# Patient Record
Sex: FEMALE | Race: ASIAN | NOT HISPANIC OR LATINO | Employment: UNEMPLOYED | URBAN - METROPOLITAN AREA
[De-identification: names, ages, dates, MRNs, and addresses within clinical notes are randomized per-mention and may not be internally consistent; named-entity substitution may affect disease eponyms.]

---

## 2021-06-30 ENCOUNTER — PATIENT OUTREACH (OUTPATIENT)
Dept: INFECTIOUS DISEASES | Facility: HOSPITAL | Age: 66
End: 2021-06-30

## 2023-01-27 DIAGNOSIS — M79.2 NEURALGIA AND NEURITIS: Primary | ICD-10-CM

## 2023-03-02 DIAGNOSIS — M79.2 NEURALGIA AND NEURITIS: Primary | ICD-10-CM

## 2024-03-18 ENCOUNTER — TELEPHONE (OUTPATIENT)
Dept: SURGERY | Facility: CLINIC | Age: 69
End: 2024-03-18
Payer: MEDICARE

## 2024-03-18 ENCOUNTER — DOCUMENTATION ONLY (OUTPATIENT)
Dept: HEMATOLOGY/ONCOLOGY | Facility: CLINIC | Age: 69
End: 2024-03-18
Payer: MEDICARE

## 2024-03-18 NOTE — TELEPHONE ENCOUNTER
LVM, for pt. To call in regards to us receiving a message that she needs an appt with colorectal surgeon.

## 2024-03-18 NOTE — TELEPHONE ENCOUNTER
----- Message from Angela Alberts sent at 3/18/2024  4:22 PM CDT -----  Regarding: appt  Type:  Sooner Apoointment Request      Name of Caller:pt    When is the first available appointment?dept booked     Symptoms: Colon      Best Call Back Number: [phone]      Additional Information: pt is looking to get schedule.  please call to discuss.

## 2024-03-18 NOTE — TELEPHONE ENCOUNTER
Dr. Rivera doesn't accept Medicaid.  Message sent back to sender, Angela Alberts to notify patient to call the Patient Access Escalation Line @ [phone] to schedule an appointment.  Len

## 2024-03-19 ENCOUNTER — TELEPHONE (OUTPATIENT)
Dept: SURGERY | Facility: CLINIC | Age: 69
End: 2024-03-19
Payer: MEDICARE

## 2024-03-19 NOTE — TELEPHONE ENCOUNTER
I called and spoke to patient about scheduling her to see Dr. Rivera.  She needs to get her Medicare insurance into Epic before I can schedule the appointment since Dr. Rivera doesn't accept Medicaid.   I transferred her to the phone  to get the Medicare information.  I'll call her back to schedule an appointment.  Len

## 2024-03-19 NOTE — TELEPHONE ENCOUNTER
Sent to Dr Nicole Vanegas MA, so they can call patients spouse back due to them having medicare coverage. ----- Message from Niurka Calle LPN sent at 3/19/2024 10:36 AM CDT -----  Colorectal surgeon  ----- Message -----  From: Ela Johnson  Sent: 3/19/2024  10:08 AM CDT  To: [facility]    Type: Need Medical Advice   Who Called: spouse of patient   Best callback number: [phone]  Additional Information: patient spouse called to schedule an appointment for the patient to see Argentina Rivera, he stated the patient has medicare coverage with [facility]  Please call to further assist, Thanks.

## 2024-03-19 NOTE — TELEPHONE ENCOUNTER
Left message stating we need medicare number/ social security number updated in our Epic system, someone put wrong information in. ----- Message from Len Esquivel MA sent at 3/19/2024 10:40 AM CDT -----  Patient needs to call the registration first to put her insurance into EPIC before I can schedule her.  Thanks,  Len  ----- Message -----  From: Essence Adames MA  Sent: 3/19/2024  10:39 AM CDT  To: Len Esquivel MA    Pt called back today saying that its medicare coverage, not medicaid. Can you please call patients spouse back to schedule appt ? Thank you   ----- Message -----  From: Niurka Calle LPN  Sent: 3/19/2024  10:37 AM CDT  To: Essence Aadmes MA    Colorectal surgeon  ----- Message -----  From: Ela Johnson  Sent: 3/19/2024  10:08 AM CDT  To: [facility]    Type: Need Medical Advice   Who Called: spouse of patient   Best callback number: [phone]  Additional Information: patient spouse called to schedule an appointment for the patient to see Argentina Rivera, he stated the patient has medicare coverage with [facility]  Please call to further assist, Thanks.

## 2024-03-19 NOTE — TELEPHONE ENCOUNTER
----- Message from Essence Adames MA sent at 3/19/2024 10:38 AM CDT -----  Pt called back today saying that its medicare coverage, not medicaid. Can you please call patients spouse back to schedule appt ? Thank you   ----- Message -----  From: Niurka Calle LPN  Sent: 3/19/2024  10:37 AM CDT  To: Essence Adames MA    Colorectal surgeon  ----- Message -----  From: Ela Johnson  Sent: 3/19/2024  10:08 AM CDT  To: [facility]    Type: Need Medical Advice   Who Called: spouse of patient   Best callback number: [phone]  Additional Information: patient spouse called to schedule an appointment for the patient to see Argentina Rivera, he stated the patient has medicare coverage with [facility]  Please call to further assist, Thanks.

## 2024-03-19 NOTE — TELEPHONE ENCOUNTER
I called and spoke to patients friend Geremias Chambers to schedule her to see Dr. Rivera in [place] on Tuesday, 4/30/24 @ 2:30pm for fecal incontinence.  Len

## 2024-03-19 NOTE — TELEPHONE ENCOUNTER
----- Message from Ayesha Davison sent at 3/19/2024 12:30 PM CDT -----  Type: Needs Medical Advice  Who Called:  Patient   Symptoms (please be specific):    How long has patient had these symptoms:    Pharmacy name and phone #:    Best Call Back Number: [phone]  Additional Information: Patient is requesting a call back to schedule an appt..

## 2024-03-22 DIAGNOSIS — M79.671 PAIN IN RIGHT FOOT: Primary | ICD-10-CM

## 2024-03-22 DIAGNOSIS — M79.672 PAIN IN LEFT FOOT: ICD-10-CM

## 2024-03-22 DIAGNOSIS — M77.31 CALCANEAL SPUR, RIGHT FOOT: Primary | ICD-10-CM

## 2024-04-30 ENCOUNTER — TELEPHONE (OUTPATIENT)
Dept: SURGERY | Facility: CLINIC | Age: 69
End: 2024-04-30
Payer: MEDICARE

## 2024-04-30 NOTE — TELEPHONE ENCOUNTER
----- Message from Wang Canchola sent at 4/30/2024 12:10 PM CDT -----  Regarding: Appt requested  Contact: [phone]  Hi, pt called to schedule an appt for Fecal incontinence. Pls call the pt at [phone](Geremias/Friend that translate)  to discuss scheduling the appt.   Thank you.

## 2024-04-30 NOTE — TELEPHONE ENCOUNTER
----- Message from Ayala De La Paz, Patient Care Assistant sent at 4/30/2024 10:55 AM CDT -----  Regarding: appointment  Contact: Geremias pt's friend  Type: Needs Medical Advice    Who Called:  Geremias pt's friend    Best Call Back Number:      Additional Information: Geremias pt's friend states he would like a callback regarding the pt's appointment on 4/30/24. Please call to advise. Thanks!

## 2024-04-30 NOTE — TELEPHONE ENCOUNTER
I called and spoke to Geremias about patients appointment being canceled for today, Tuesday, 4/30/24 @ 2:30pm with Dr. Rivera due to her insurance not being accepted.  He called [facility] to inquire about who takes her insurance.  Len

## 2024-05-01 ENCOUNTER — TELEPHONE (OUTPATIENT)
Dept: SURGERY | Facility: CLINIC | Age: 69
End: 2024-05-01
Payer: MEDICARE

## 2024-05-01 NOTE — TELEPHONE ENCOUNTER
Spoke with Geremias, friend. Patient speaks Chinese, but Geremias will be there for appt. Appointment changed to after June 17th due to patient being out of town. Will mail new appointment slip.

## 2024-05-01 NOTE — TELEPHONE ENCOUNTER
----- Message from Mey Cristina NP sent at 5/1/2024  1:22 PM CDT -----  Regarding: FW: r/s appt  Contact: Pt @ [phone]  She's returnig your call  ----- Message -----  From: Bridgette Thompson  Sent: 5/1/2024  12:34 PM CDT  To: Ibeth Mathias Staff  Subject: r/s appt                                         Pt is calling to speak to someone in the office to r/s her appt that she is currently scheduled for; no available appts in Epic. Please call to advise. Thanks.            Additional Info:  Pt will be out of town please schedule after June 17th

## 2024-06-20 ENCOUNTER — OFFICE VISIT (OUTPATIENT)
Dept: SURGERY | Facility: CLINIC | Age: 69
End: 2024-06-20
Payer: MEDICARE

## 2024-06-20 VITALS — WEIGHT: 126.13 LBS | HEART RATE: 61 BPM | DIASTOLIC BLOOD PRESSURE: 75 MMHG | SYSTOLIC BLOOD PRESSURE: 129 MMHG

## 2024-06-20 DIAGNOSIS — R39.81 FUNCTIONAL URINARY INCONTINENCE: Primary | ICD-10-CM

## 2024-06-20 DIAGNOSIS — R15.1 FECAL SMEARING: ICD-10-CM

## 2024-06-20 PROCEDURE — 3288F FALL RISK ASSESSMENT DOCD: CPT | Mod: CPTII,S$GLB,, | Performed by: NURSE PRACTITIONER

## 2024-06-20 PROCEDURE — 1101F PT FALLS ASSESS-DOCD LE1/YR: CPT | Mod: CPTII,S$GLB,, | Performed by: NURSE PRACTITIONER

## 2024-06-20 PROCEDURE — 3078F DIAST BP <80 MM HG: CPT | Mod: CPTII,S$GLB,, | Performed by: NURSE PRACTITIONER

## 2024-06-20 PROCEDURE — 4010F ACE/ARB THERAPY RXD/TAKEN: CPT | Mod: CPTII,S$GLB,, | Performed by: NURSE PRACTITIONER

## 2024-06-20 PROCEDURE — 3074F SYST BP LT 130 MM HG: CPT | Mod: CPTII,S$GLB,, | Performed by: NURSE PRACTITIONER

## 2024-06-20 PROCEDURE — 1159F MED LIST DOCD IN RCRD: CPT | Mod: CPTII,S$GLB,, | Performed by: NURSE PRACTITIONER

## 2024-06-20 PROCEDURE — 1126F AMNT PAIN NOTED NONE PRSNT: CPT | Mod: CPTII,S$GLB,, | Performed by: NURSE PRACTITIONER

## 2024-06-20 PROCEDURE — 99999 PR PBB SHADOW E&M-EST. PATIENT-LVL III: CPT | Mod: PBBFAC,,, | Performed by: NURSE PRACTITIONER

## 2024-06-20 PROCEDURE — 99204 OFFICE O/P NEW MOD 45 MIN: CPT | Mod: S$GLB,,, | Performed by: NURSE PRACTITIONER

## 2024-06-20 RX ORDER — LOSARTAN POTASSIUM 25 MG/1
1 TABLET ORAL DAILY
COMMUNITY
Start: 2024-06-17

## 2024-06-20 RX ORDER — CLOPIDOGREL BISULFATE 75 MG/1
TABLET ORAL
COMMUNITY
Start: 2024-06-17

## 2024-06-20 RX ORDER — BENZONATATE 100 MG/1
100 CAPSULE ORAL 3 TIMES DAILY PRN
COMMUNITY
Start: 2023-11-09

## 2024-06-20 RX ORDER — LISINOPRIL 20 MG/1
20 TABLET ORAL
COMMUNITY

## 2024-06-20 RX ORDER — ERGOCALCIFEROL 1.25 MG/1
1 CAPSULE ORAL
COMMUNITY
Start: 2024-03-13

## 2024-06-20 RX ORDER — ATORVASTATIN CALCIUM 40 MG/1
TABLET, FILM COATED ORAL
COMMUNITY
Start: 2024-06-17

## 2024-06-20 RX ORDER — LEVOTHYROXINE SODIUM 50 UG/1
TABLET ORAL
COMMUNITY
Start: 2024-06-17

## 2024-06-20 NOTE — PROGRESS NOTES
CRS Office Visit History and Physical    Referring Md:   Self, Aaareferral  No address on file    SUBJECTIVE:     Chief Complaint: fecal incontinence    History of Present Illness:  The patient is new patient to this practice.   Course is as follows:  Patient is a 68 y.o. female presents with fecal incontinence.She is here with her friend Geremias, who is her . [facility]   >1 a year is symptom onset  She is unaware it is happening  Fecal smearing.   3 vaginal deliveries   Has urinary incontinence  Does not have a BM every day  When she does have a BM, its bristol type one stools.   Rectal pain sometimes  No rectal bleeding.    Family history of colorectal cancer or IBD: no.  Mother ovarian cancer    Review of patient's allergies indicates:  No Known Allergies    No past medical history on file.  No past surgical history on file.  No family history on file.  Social History     Tobacco Use    Smoking status: Never    Smokeless tobacco: Never        Review of Systems:  Review of Systems   Constitutional:  Negative for chills and fever.       OBJECTIVE:     Vital Signs (Most Recent)  /75 (BP Location: Right arm, Patient Position: Sitting, BP Method: Medium (Automatic))   Pulse 61   Wt 57.2 kg (126 lb 1.7 oz)     Physical Exam:  General:  female in no distress   Neuro: Alert and oriented to person, place, and time.  Moves all extremities.     HEENT: No icterus.  Trachea midline  Respiratory: Respirations are even and unlabored, no cough or audible wheezing  Skin: Warm dry and intact, No visible rashes, no jaundice    Labs reviewed today:  Lab Results   Component Value Date    WBC 4.8 03/13/2024    HGB 13.3 03/13/2024    HCT 39.3 03/13/2024     06/17/2021    K 3.9 06/17/2021    CREATININE 0.71 06/17/2021    BUN 23.0 06/17/2021    CO2 27 06/17/2021    TSH 2.02 06/17/2024         Anorectal Exam:    Anal Skin: Normal    Digital Rectal Exam:  Resting Tone normal  Squeeze  normal  Relaxation with bear down present  Mass none  Tenderness  absent    Anoscopy:  Verbal consent was obtained.   Clear plastic anoscope inserted.    Hemorrhoids  absent  Stigmata of bleeding  absent  Stigmata of prolapsed  absent  Distal rectal mucosa formed stool in rectal vault, unable to visualize      ASSESSMENT/PLAN:     Diagnoses and all orders for this visit:    Functional urinary incontinence  -     Ambulatory referral/consult to Physical/Occupational Therapy; Future    Fecal smearing  -     Ambulatory referral/consult to Physical/Occupational Therapy; Future    68 y.o. F here today for FI. This could be overflow FI. Also has urinary incontinence.   For now treat her constipation w fiber and water.   Will get recent scope results, med record request faxed by Pearl FRAZIER  If no improvement f/u w MD, pt agreed to call me    IVETTE George  Colon and Rectal Surgery

## 2024-06-20 NOTE — PATIENT INSTRUCTIONS
Start fiber supplement such as Fibercon (capsule) Citrucel or Metamucil every day, increase as tolerated per  instructions   Water intake of 64 oz per day  Warm sitz baths as needed  Do not sit on the toilet for more than 5 mins at a time     Pelvic Floor PT will reach out to you to schedule. If you do not hear from them in the next few day, or if you would like to contact them to schedule the number is [phone]

## 2024-06-26 ENCOUNTER — TELEPHONE (OUTPATIENT)
Dept: SURGERY | Facility: CLINIC | Age: 69
End: 2024-06-26
Payer: MEDICARE

## 2024-06-26 DIAGNOSIS — R15.1 FECAL SMEARING: Primary | ICD-10-CM

## 2024-06-26 NOTE — TELEPHONE ENCOUNTER
----- Message from Sharon Dennison sent at 6/26/2024 10:43 AM CDT -----  Regarding: referral needed  Contact: pt @  [phone]  Pt is requesting a referral be sent to Dr. Suad Gastelum on [address] [facility] fax number [phone]  phone [phone]. Please call to advise further. Thank you for all you are doing.

## 2024-09-03 ENCOUNTER — TELEPHONE (OUTPATIENT)
Dept: OBSTETRICS AND GYNECOLOGY | Facility: CLINIC | Age: 69
End: 2024-09-03
Payer: MEDICARE

## 2024-10-01 ENCOUNTER — TELEPHONE (OUTPATIENT)
Dept: SURGERY | Facility: CLINIC | Age: 69
End: 2024-10-01
Payer: MEDICARE

## 2024-10-01 NOTE — TELEPHONE ENCOUNTER
----- Message from Med Assistant Kang sent at 10/1/2024 10:41 AM CDT -----  Regarding: RE: appt  Contact: Geremias, shania program @[phone]  Ms. Mackey returned the call.  She has completed th PFPT.  She has noticed some improvement .  If she needs and appointment, she would like to be seen within the next week because she's going out of town for 3 months. Waiting for recommendations for next step.  ----- Message -----  From: Mey Cristina NP  Sent: 10/1/2024  10:18 AM CDT  To: Pearl Conway MA  Subject: FW: appt                                         Please call pt, find out if she did PFPT. If she didn't we need to get her an appt. If she did and is still having incontinence then we need to get her an appt w Casey  ----- Message -----  From: Subha Dao  Sent: 10/1/2024  10:14 AM CDT  To: Ibeth Mathias Staff  Subject: appt                                             Caller stated pt needs future test type appts made. Pls call to discuss. Provider told pt after PT , next step will be decided. Pls call to better discuss.

## 2024-10-01 NOTE — TELEPHONE ENCOUNTER
Her friend Geremias used as  for phone convo.  There is some improvement in her symptoms.  They have included more fiber in diet.   She is about to be on vacation for 3 months  For now we agreed if she wants to get seen by Surgeon for further eval when she returns from vacation then they can let me know

## 2025-04-14 ENCOUNTER — TELEPHONE (OUTPATIENT)
Dept: SURGERY | Facility: CLINIC | Age: 70
End: 2025-04-14
Payer: MEDICARE

## 2025-04-14 NOTE — TELEPHONE ENCOUNTER
Spoke with patient and friend Geremias regarding rescheduling appointment with MD. Appointment rescheduled and details confirmed verbally over phone.

## 2025-04-14 NOTE — TELEPHONE ENCOUNTER
----- Message from ABI Mathias sent at 4/11/2025  5:25 PM CDT -----    ----- Message -----  From: Mey Cristina NP  Sent: 4/10/2025   9:34 AM CDT  To: Casey Smith Staff    This is a patient that I have seen and instructed if she continue with FI then needed MD f/u, they are scheduled with me again. Plz get with Dr. Peña and cancel w me. thanks

## 2025-04-23 ENCOUNTER — OFFICE VISIT (OUTPATIENT)
Dept: UROLOGY | Facility: CLINIC | Age: 70
End: 2025-04-23
Payer: MEDICARE

## 2025-04-23 VITALS — SYSTOLIC BLOOD PRESSURE: 125 MMHG | DIASTOLIC BLOOD PRESSURE: 74 MMHG | WEIGHT: 122.81 LBS | HEART RATE: 56 BPM

## 2025-04-23 DIAGNOSIS — N39.3 STRESS INCONTINENCE: Primary | ICD-10-CM

## 2025-04-23 PROCEDURE — 1160F RVW MEDS BY RX/DR IN RCRD: CPT | Mod: CPTII,S$GLB,,

## 2025-04-23 PROCEDURE — 1101F PT FALLS ASSESS-DOCD LE1/YR: CPT | Mod: CPTII,S$GLB,,

## 2025-04-23 PROCEDURE — 99999 PR PBB SHADOW E&M-EST. PATIENT-LVL III: CPT | Mod: PBBFAC,,,

## 2025-04-23 PROCEDURE — 3078F DIAST BP <80 MM HG: CPT | Mod: CPTII,S$GLB,,

## 2025-04-23 PROCEDURE — 3288F FALL RISK ASSESSMENT DOCD: CPT | Mod: CPTII,S$GLB,,

## 2025-04-23 PROCEDURE — 99205 OFFICE O/P NEW HI 60 MIN: CPT | Mod: S$GLB,,,

## 2025-04-23 PROCEDURE — 1159F MED LIST DOCD IN RCRD: CPT | Mod: CPTII,S$GLB,,

## 2025-04-23 PROCEDURE — 3074F SYST BP LT 130 MM HG: CPT | Mod: CPTII,S$GLB,,

## 2025-04-23 PROCEDURE — G2211 COMPLEX E/M VISIT ADD ON: HCPCS | Mod: S$GLB,,,

## 2025-04-23 PROCEDURE — 1126F AMNT PAIN NOTED NONE PRSNT: CPT | Mod: CPTII,S$GLB,,

## 2025-04-23 PROCEDURE — 4010F ACE/ARB THERAPY RXD/TAKEN: CPT | Mod: CPTII,S$GLB,,

## 2025-04-23 NOTE — PROGRESS NOTES
[facility]      New Stress Incontinence Note    4/23/2025    Referred by:  No ref. provider found    History of Present Illness    CHIEF COMPLAINT:  Patient presents today for urinary incontinence.  utilized for appt.     URINARY AND FECAL INCONTINENCE:  She reports worsening urinary incontinence over the past couple of years, particularly with coughing. She uses pads daily for management.     PREVIOUS TREATMENT:  She previously attended physical therapy. She was instructed to perform daily exercises but has not been adherent to the home exercise program.        A review of 10+ systems was conducted with pertinent positive and negative findings documented in HPI with all other systems reviewed and negative.    Past medical, family, surgical and social history reviewed as documented in chart with pertinent positive medical, family, surgical and social history detailed in HPI.    Previous ANASTASIIA therapies:  pelvic floor exercises without therapy  pelvic floor exercises guided by PFPT    Exam Findings:    Const: no acute distress, conversant and alert  Eyes: anicteric, extraocular muscles intact  ENMT: normocephalic, Nl oral membranes  Cardio: no cyanosis, nl cap refill  Pulm: no tachypnea; no resp distress  Abd: soft, no tenderness  Musc: no laceration, no tenderness  Neuro: alert; oriented x 3  Skin: warm, dry; no petichiae  Psych: no anxiety; normal speech       Assessment/Plan:    Assessment & Plan    IMPRESSION:  - Presents with stress incontinence  - Considered urethral sling surgery as potential treatment option if conservative measures fail.  - Recommend urodynamics testing to determine suitability for surgery and optimize surgical plan if patient decides to pursue this option.  - Expressed preference for conservative management at this time.    STRESS INCONTINENCE (FEMALE):  1. Explained stress incontinence as urine leakage during activities that increase abdominal pressure (e.g.,  coughing, sneezing, laughing).  2. Discussed pelvic floor anatomy and its role in urinary continence.  3. Discussed risks and benefits of urethral sling surgery, including potential for urethral erosion and need for revision.  4. Provided overview of urethral sling surgery, including placement of mesh to support the urethra.  5. Explained that urethral sling surgery does not increase cancer risk.  6. Patient to resume pelvic floor physical therapy exercises as previously instructed.  7. Referred to pelvic floor physical therapy.    FOLLOW-UP:  1. Contact the office when ready to discuss surgical options further.  2. Follow up with option for patient's daughter to join future appointment via phone to discuss treatment options.       I spent a total of 60 minutes on the day of the visit.  This includes face to face time and non-face to face time preparing to see the patient (eg, review of tests), obtaining and/or reviewing separately obtained history, documenting clinical information in the electronic or other health record, independently interpreting results and communicating results to the patient/family/caregiver, or care coordinator.     Visit complexity today is associated with medical care services that are part of the ongoing care related to the single serious and/or complex condition of Stress incontinence (ANASTASIIA). A longitudinal relationship exists or is being developed between the patient and this practitioner for the care of this condition.

## 2025-04-29 ENCOUNTER — TELEPHONE (OUTPATIENT)
Dept: UROLOGY | Facility: CLINIC | Age: 70
End: 2025-04-29
Payer: MEDICARE

## 2025-04-29 ENCOUNTER — OFFICE VISIT (OUTPATIENT)
Dept: OTOLARYNGOLOGY | Facility: CLINIC | Age: 70
End: 2025-04-29
Payer: MEDICARE

## 2025-04-29 VITALS — DIASTOLIC BLOOD PRESSURE: 87 MMHG | WEIGHT: 121.25 LBS | HEART RATE: 79 BPM | SYSTOLIC BLOOD PRESSURE: 149 MMHG

## 2025-04-29 DIAGNOSIS — R05.3 CHRONIC COUGH: Primary | ICD-10-CM

## 2025-04-29 DIAGNOSIS — R09.A2 GLOBUS SENSATION: ICD-10-CM

## 2025-04-29 DIAGNOSIS — K11.7 XEROSTOMIA: ICD-10-CM

## 2025-04-29 PROBLEM — Z98.61 S/P CORONARY ANGIOPLASTY: Status: ACTIVE | Noted: 2018-09-21

## 2025-04-29 PROBLEM — Z78.9 LANGUAGE BARRIER TO COMMUNICATION: Status: ACTIVE | Noted: 2019-03-04

## 2025-04-29 PROBLEM — E03.9 HYPOTHYROIDISM: Status: ACTIVE | Noted: 2018-11-27

## 2025-04-29 PROBLEM — R20.2 PARESTHESIA: Status: ACTIVE | Noted: 2025-04-29

## 2025-04-29 PROBLEM — R41.3 MEMORY DEFICITS: Status: ACTIVE | Noted: 2019-03-04

## 2025-04-29 PROBLEM — Z86.0100 HISTORY OF COLON POLYPS: Status: ACTIVE | Noted: 2019-10-12

## 2025-04-29 PROBLEM — Z85.42 HISTORY OF ENDOMETRIAL CANCER: Status: ACTIVE | Noted: 2024-09-11

## 2025-04-29 PROBLEM — I25.10 CORONARY ARTERY DISEASE INVOLVING NATIVE CORONARY ARTERY OF NATIVE HEART WITHOUT ANGINA PECTORIS: Status: ACTIVE | Noted: 2018-09-12

## 2025-04-29 PROBLEM — M20.60 ACQUIRED DEFORMITY OF TOE: Status: ACTIVE | Noted: 2023-08-17

## 2025-04-29 PROBLEM — N95.2 ATROPHIC VAGINITIS: Status: ACTIVE | Noted: 2021-10-11

## 2025-04-29 PROBLEM — R15.9 BOWEL INCONTINENCE: Status: ACTIVE | Noted: 2021-05-03

## 2025-04-29 PROBLEM — K57.30 DIVERTICULAR DISEASE OF COLON: Status: ACTIVE | Noted: 2019-10-12

## 2025-04-29 PROBLEM — I10 ESSENTIAL HYPERTENSION: Status: ACTIVE | Noted: 2018-09-12

## 2025-04-29 PROBLEM — K64.0 GRADE I HEMORRHOIDS: Status: ACTIVE | Noted: 2019-10-12

## 2025-04-29 PROCEDURE — 1126F AMNT PAIN NOTED NONE PRSNT: CPT | Mod: CPTII,S$GLB,, | Performed by: PHYSICIAN ASSISTANT

## 2025-04-29 PROCEDURE — 1160F RVW MEDS BY RX/DR IN RCRD: CPT | Mod: CPTII,S$GLB,, | Performed by: PHYSICIAN ASSISTANT

## 2025-04-29 PROCEDURE — 3077F SYST BP >= 140 MM HG: CPT | Mod: CPTII,S$GLB,, | Performed by: PHYSICIAN ASSISTANT

## 2025-04-29 PROCEDURE — 99204 OFFICE O/P NEW MOD 45 MIN: CPT | Mod: 25,S$GLB,, | Performed by: PHYSICIAN ASSISTANT

## 2025-04-29 PROCEDURE — 3288F FALL RISK ASSESSMENT DOCD: CPT | Mod: CPTII,S$GLB,, | Performed by: PHYSICIAN ASSISTANT

## 2025-04-29 PROCEDURE — 3079F DIAST BP 80-89 MM HG: CPT | Mod: CPTII,S$GLB,, | Performed by: PHYSICIAN ASSISTANT

## 2025-04-29 PROCEDURE — 4010F ACE/ARB THERAPY RXD/TAKEN: CPT | Mod: CPTII,S$GLB,, | Performed by: PHYSICIAN ASSISTANT

## 2025-04-29 PROCEDURE — 1159F MED LIST DOCD IN RCRD: CPT | Mod: CPTII,S$GLB,, | Performed by: PHYSICIAN ASSISTANT

## 2025-04-29 PROCEDURE — 1101F PT FALLS ASSESS-DOCD LE1/YR: CPT | Mod: CPTII,S$GLB,, | Performed by: PHYSICIAN ASSISTANT

## 2025-04-29 PROCEDURE — 99999 PR PBB SHADOW E&M-EST. PATIENT-LVL III: CPT | Mod: PBBFAC,,, | Performed by: PHYSICIAN ASSISTANT

## 2025-04-29 PROCEDURE — 31575 DIAGNOSTIC LARYNGOSCOPY: CPT | Mod: S$GLB,,, | Performed by: PHYSICIAN ASSISTANT

## 2025-04-29 RX ORDER — METOPROLOL SUCCINATE 50 MG/1
50 TABLET, EXTENDED RELEASE ORAL DAILY
COMMUNITY
Start: 2025-04-03

## 2025-04-29 RX ORDER — ALBUTEROL SULFATE 90 UG/1
2 INHALANT RESPIRATORY (INHALATION) EVERY 6 HOURS PRN
COMMUNITY
End: 2025-04-29

## 2025-04-29 RX ORDER — FAMOTIDINE 20 MG/1
20 TABLET, FILM COATED ORAL 2 TIMES DAILY
COMMUNITY
End: 2025-04-29

## 2025-04-29 RX ORDER — LORATADINE 10 MG/1
10 TABLET ORAL DAILY
COMMUNITY
End: 2025-04-29

## 2025-04-29 RX ORDER — OMEPRAZOLE 40 MG/1
40 CAPSULE, DELAYED RELEASE ORAL EVERY MORNING
Qty: 90 CAPSULE | Refills: 2 | Status: SHIPPED | OUTPATIENT
Start: 2025-04-29 | End: 2026-04-29

## 2025-04-29 RX ORDER — PREDNISOLONE ACETATE 10 MG/ML
1 SUSPENSION/ DROPS OPHTHALMIC 3 TIMES DAILY
COMMUNITY
End: 2025-04-29

## 2025-04-29 RX ORDER — PANTOPRAZOLE SODIUM 40 MG/1
40 TABLET, DELAYED RELEASE ORAL DAILY
COMMUNITY
End: 2025-04-29

## 2025-04-29 RX ORDER — OXYBUTYNIN CHLORIDE 10 MG/1
10 TABLET, EXTENDED RELEASE ORAL DAILY
COMMUNITY
End: 2025-04-29

## 2025-04-29 NOTE — TELEPHONE ENCOUNTER
Scheduled pt   ----- Message from Yara sent at 4/29/2025 11:07 AM CDT -----  Type:  Needs Medical AdviceWho Called:  PtSymptoms (please be specific):  How long has patient had these symptoms:    Pharmacy name and phone #:   Would the patient rather a call back or a response via [facility]?  Call [facility] Call Back Number:  [phone] Information:  Pt  called would  like a call back  regarding  surgery details

## 2025-04-29 NOTE — PROGRESS NOTES
Subjective:     HPI: Nel Mackey is a 69 y.o. female with mild asthma, CAD s/p angioplasty 9/21/2018, HTN, and memory deficits who was self-referred for chronic cough.    Reports developing a cough about 6 months ago with associated globus sensation.  Patient reports symptoms are worse usually when lying flat and especially overnight but can occur during the day.  Patient reports sometimes having the need to cough so strong that causes her to vomit.  She denies any dysphagia, rhinorrhea, sneezing, nasal obstruction, recurrent sinus infections, or facial pressure.   Cough is not triggered by cold liquids, deep breathing, laughing, talking, eating, or after eating.  Sometimes in his worse with bad smells.  Patient reports previously undergoing PFTs which were normal.  She does not report significant caffeine intake but does report eating often at night before bed    Current sinonasal medications include none at present.  The last course of antibiotics was recently.    She does not recall previously having allergy testing.  She denies a history of asthma.  She denies a history of reflux symptoms.    She denies a diagnosis of obstructive sleep apnea.   She does not recall a prior history of nasal trauma.  She has not had sinonasal surgery.      She is not a tobacco smoker.     Past Medical/Past Surgical History  No past medical history on file.  She has no past surgical history on file.    Family History/Social History  Her family history is not on file.  She reports that she has never smoked. She has never used smokeless tobacco.    Allergies/Immunizations  She has no known allergies.  Immunization History   Administered Date(s) Administered    COVID-19, MRNA, LN-S, PF (Pfizer) (Purple Cap) 03/04/2021        Medications   aspirin Cap  atorvastatin  clopidogreL  ergocalciferol Cap  levothyroxine  lisinopriL  metoprolol succinate  omeprazole     Review of Systems   HENT: Negative for runny nose, sinus pressure, sore  "throat, stuffy nose, trouble swallowing and voice change.      Respiratory:  Positive for cough. Negative for sleep apnea.            Objective:     BP (!) 149/87 (BP Location: Right arm, Patient Position: Sitting)   Pulse 79   Wt 55 kg (121 lb 4.1 oz)      Physical Exam  Constitutional:       Appearance: Normal appearance. She is well-developed.   HENT:      Head: Normocephalic and atraumatic.      Right Ear: External ear normal.      Left Ear: External ear normal.      Nose: No septal deviation, mucosal edema or rhinorrhea.      Right Nostril: No epistaxis.      Left Nostril: No epistaxis.      Right Turbinates: Enlarged.      Left Turbinates: Enlarged.      Right Sinus: No maxillary sinus tenderness or frontal sinus tenderness.      Left Sinus: No maxillary sinus tenderness or frontal sinus tenderness.      Mouth/Throat:      Lips: Pink. No lesions.      Tongue: No lesions. Tongue does not deviate from midline.      Palate: No mass and lesions.      Pharynx: Oropharynx is clear. Uvula midline. No pharyngeal swelling, oropharyngeal exudate, posterior oropharyngeal erythema or uvula swelling.      Tonsils: No tonsillar exudate or tonsillar abscesses.   Eyes:      Extraocular Movements: Extraocular movements intact.      Conjunctiva/sclera: Conjunctivae normal.   Neck:      Thyroid: No thyromegaly or thyroid tenderness.   Lymphadenopathy:      Cervical: No cervical adenopathy.   Psychiatric:         Mood and Affect: Mood normal.         Behavior: Behavior normal. Behavior is cooperative.          Procedure    Flexible laryngoscopy performed.  See procedure note.              Data Reviewed  I personally reviewed the chart, including any outside records, and pertinent data below:    I reviewed the following notes Internal Medicine     WBC (103/uL)   Date Value   09/19/2024 6.6     No results found for: "LYMPH"  No results found for: "EOSINOPHIL"  No results found for: "IGE"  No results found for: "EOS"    No " "results found for: "PLT"  Glucose (mg/dL)   Date Value   03/10/2023 83     CT chest 7/22/24 at OSH  "No acute intrathoracic process. Few bibasilar linear opacities may reflect atelectasis and/or scarring."    No sinus imaging available.    Assessment & Plan:     1. Chronic cough  2. Globus sensation  -    suspect multifactorial etiology for patient's cough.  - FFL with evidence of dry secretions in oropharynx and LPR changes without rhinitis  - Discussed the etiology of LPR and management strategies including nonpharmacologic treatments: eating smaller meals, eating at least 3 hours before bed, elevation of the head of bed at night, avoidance of caffeine, chocolate, nicotine and peppermint, and avoiding tight fitting clothing.    - May consider GI referral if symptoms are refractory to medication management.  -  omeprazole (PRILOSEC) 40 MG capsule; Take 1 capsule (40 mg total) by mouth every morning.  Dispense: 90 capsule; Refill: 2    3. Xerostomia  - Xylimelts QHS; Biotene mouth wash during the day  - Increase your water intake to 64-80 oz daily  - consider room humidifier     She will Follow up in about 2 months (around 6/29/2025).  I had a discussion with the patient and her friend  regarding her condition and the further workup and management options.    All questions were answered, and the patient is in agreement with the above.     Disclaimer:  This note may have been prepared utilizing voice recognition software which may result in occasional typographical errors in the text such as sound alike words.   If further clarification is needed, please contact the ENT department of [facility].  "

## 2025-04-29 NOTE — PATIENT INSTRUCTIONS
"  DRY THROAT/MOUTH Remedies:  - Over the counter Lozenges that help with moisture:  Cushing Soothing - sold with cough drops  Xylimelts - on toothpaste aisle at [facility] with dry mouth products or online, these are convenient for when you are talking and or sleeping - they stick to gumline and provide moisture so you won't be so dry overnight. You can even use them during the day when you are singing. They stay in place when you are sleeping, to reduce risk of choking on them  - Biotene mouth wash during the day for dry mouth  - Increase your water intake to 64-80 oz daily to help thin mucus  - Can consider humidifier while you sleep       LARYNGOPHARYNGEAL REFLUX  (SILENT OR ATYPICAL REFLUX)    LPR is a very challenging disease as it includes a vast range of symptoms and difficult to diagnose.      SYMPTOMS  If you have any of the following symptoms you MAY have laryngopharyngeal reflux (LPR):    1. Hoarseness  2. Sore throat  4. throat clearing, sometimes clearing thick mucous  5. chronic cough, especially cough that wakes you up from sleep  6.  "postnasal drip" without the need to blow your nose  7. Globus sensation, like the sensation of something being stuck in the throat  8.  Red, swollen or irritated larynx (voice box)    HOW  Many people with LPR do not have symptoms of heartburn. Compared to the esophagus, the voice box and the back of the throat are significantly more sensitive to the effects of acid and digestive enzymes on surrounding tissue. Acid passing quickly through the esophagus does not have a chance to irritate the area for too long.  However acid that pools in the throat or voice box can cause prolonged irritation resulting in the symptoms of LPR.    DIAGNOSIS  A common procedure performed by an ENT is called flexible laryngoscopy.  A thin tube is inserted through the nose in order to visualize the larynx (the voice box). This method can detect abnormalities in the voice box ranging from polyps to " laryngeal cancer.  This can also reveal signs of LPR, but is not 100% reliable.      If symptoms persist despite medical treatment listed below, further testing is needed.  Three commonly used tests are: a swallowing study; a direct look at the stomach and esophagus through an endoscope, and; an esophageal pH test.  Further testing is usually coordinated with a gastroenterologist.     TREATMENT  Lifestyle changes  1. Do not smoke.  Smoking will worsen reflux.    2. Avoid eating at least 2-3 hours prior to bedtime.  Try to avoid very large meals at night.    4. Weight loss.  For patient's with recent weight gain, shedding a few pounds is all that is required to improve reflux.    5. Avoid reflux triggers. These can worsen acid in the stomach or even cause the esophagus muscles to relax: caffeine, soft drinks, acidic foods (tomato, lots of fruit) mints, alcoholic beverages, particularly at night, cheese, fried foods, spicy foods, eggs, and chocolate.    6. Sleep with the head of bed elevated at least 6 inches.    Consider reading the book Dropping Acid by Dino Pizarro MD.  This has information to help choose meals with less acid.     Other Treatments:  All natural remedies include Reflux Gourmet (and Reflux Raft) which create a temporary protective barrier in your stomach to prevent reflux into your esophagus. This can be an effective therapy without the side effects of normal acid reducing medications and was also developed by laryngologists (LPR specialists).   Speech Therapy: education and techniques aimed at helping you control the cough     Medications  Take over-the-counter (OTC) medications, including antacids, such as Gaviscon Advance (others include Tums®, Maalox®, or Mylanta) as needed  Prescription and OTC stomach acid reducers: H2 blockers such as famotidine (Pepcid®), cimetidine (Tagamet®), ranitidine (Zantac®) OR proton pump inhibitors (PPIs), such as omeprazole (Prilosec®), pantoprazole (Protonix®),  and esomeprazole (Nexium®).  Most patients will begin to notice some relief in their symptoms about 2-4 weeks after starting an acid reducing medication; however it is generally recommended the medication should be continued for at least 2 months. If the symptoms completely resolve, the medication can then be tapered.  Some people will remain symptom free while others may have relapses which required treatment again.

## 2025-04-29 NOTE — PROCEDURES
Laryngoscopy    Date/Time: 4/29/2025 2:30 PM    Performed by: Chris Johnson PA-C  Authorized by: Chris Johnson PA-C    Consent Done?:  Yes (Verbal)  Anesthesia:     Local anesthetic:  Phenylephrine spray    Patient tolerance:  Patient tolerated the procedure well with no immediate complications  Laryngoscopy:     Areas examined:  Nasal cavities, nasopharynx, oropharynx, hypopharynx, larynx and vocal cords    Laryngoscope size:  4 mm  Nose Intranasal:      Mucosa no polyps     Mucosa ulcers not present     No mucosa lesions present     No septum gross deformity     Enlarged turbinates  Nasopharynx:      No mucosa lesions     Adenoids present     Posterior choanae patent     Eustachian tube patent  Larynx/hypopharynx:      No epiglottis lesions     No epiglottis edema     No AE folds lesions     No vocal cord polyps     Equal and normal bilateral     No hypopharynx lesions     No piriform sinus pooling     No piriform sinus lesions     Post cricoid edema     No post cricoid erythema     Secretions in oropharynx and LPR changes

## 2025-04-30 ENCOUNTER — TELEPHONE (OUTPATIENT)
Dept: SURGERY | Facility: CLINIC | Age: 70
End: 2025-04-30
Payer: MEDICARE

## 2025-05-02 ENCOUNTER — OFFICE VISIT (OUTPATIENT)
Dept: SURGERY | Facility: CLINIC | Age: 70
End: 2025-05-02
Attending: COLON & RECTAL SURGERY
Payer: MEDICARE

## 2025-05-02 VITALS
DIASTOLIC BLOOD PRESSURE: 73 MMHG | WEIGHT: 119.69 LBS | SYSTOLIC BLOOD PRESSURE: 163 MMHG | BODY MASS INDEX: 24.13 KG/M2 | HEIGHT: 59 IN | HEART RATE: 68 BPM

## 2025-05-02 DIAGNOSIS — R15.9 INCONTINENCE OF FECES, UNSPECIFIED FECAL INCONTINENCE TYPE: Primary | ICD-10-CM

## 2025-05-02 PROCEDURE — 99999 PR PBB SHADOW E&M-EST. PATIENT-LVL III: CPT | Mod: PBBFAC,,, | Performed by: COLON & RECTAL SURGERY

## 2025-05-20 ENCOUNTER — TELEPHONE (OUTPATIENT)
Dept: OTOLARYNGOLOGY | Facility: CLINIC | Age: 70
End: 2025-05-20
Payer: MEDICARE

## 2025-05-20 NOTE — TELEPHONE ENCOUNTER
----- Message from America Vasquez sent at 5/20/2025  8:29 AM CDT -----  Regarding: pt advice  PATIENT JOSEPt's daughter Lorna called regarding omeprazole script. Her 2 mo f/u was rescheduled to August, she'd like to know whether the medication can be dc'd since her mom's condition has improved.  Please call back at [phone]. Leave a VM if she doesn't answer

## 2025-05-23 ENCOUNTER — TELEPHONE (OUTPATIENT)
Dept: UROLOGY | Facility: CLINIC | Age: 70
End: 2025-05-23
Payer: MEDICARE

## 2025-05-23 NOTE — TELEPHONE ENCOUNTER
Left vm stating pt should do pelvic floor therapy before appointment   ----- Message from Ismael Stacy sent at 5/21/2025  4:40 PM CDT -----    ----- Message -----  From: Jhoana Bianchi LPN  Sent: 5/21/2025   4:26 PM CDT  To: [facility] Clinical Support Staff      ----- Message -----  From: Corrina Vásquez  Sent: 5/21/2025   2:32 PM CDT  To: [facility] Staff    Type:  Needs Medical AdviceWho Called: Ms Lorna Mackey daughter Would the patient rather a call back or a response via [facility]? Call Best Call Back Number: [phone] Information: she needs to know if her mom needs to state her pelvic floor therapy before she come back in to see the provider please call

## 2025-07-11 ENCOUNTER — TELEPHONE (OUTPATIENT)
Dept: UROLOGY | Facility: CLINIC | Age: 70
End: 2025-07-11
Payer: MEDICARE

## 2025-07-11 NOTE — TELEPHONE ENCOUNTER
Called pt. Pt requested me to call her daughter. Called pt's daughter no answer. LVM to call back    Copied from CRM #[id number]. Topic: Appointments - Appointment Access  >> Jul 11, 2025  3:43 PM Tila wrote:  Pt is calling to see if pt need to wait until therapy or so pt need to be seen before therapy starts, asking for call back

## 2025-08-12 ENCOUNTER — OFFICE VISIT (OUTPATIENT)
Dept: OTOLARYNGOLOGY | Facility: CLINIC | Age: 70
End: 2025-08-12
Payer: MEDICARE

## 2025-08-12 ENCOUNTER — DOCUMENTATION ONLY (OUTPATIENT)
Dept: OTOLARYNGOLOGY | Facility: CLINIC | Age: 70
End: 2025-08-12

## 2025-08-12 VITALS
SYSTOLIC BLOOD PRESSURE: 127 MMHG | HEART RATE: 61 BPM | DIASTOLIC BLOOD PRESSURE: 75 MMHG | BODY MASS INDEX: 25.83 KG/M2 | WEIGHT: 127.88 LBS

## 2025-08-12 DIAGNOSIS — K11.7 XEROSTOMIA: ICD-10-CM

## 2025-08-12 DIAGNOSIS — R05.3 CHRONIC COUGH: Primary | ICD-10-CM

## 2025-08-12 DIAGNOSIS — R53.83 FATIGUE, UNSPECIFIED TYPE: ICD-10-CM

## 2025-08-12 DIAGNOSIS — R09.A2 GLOBUS SENSATION: ICD-10-CM

## 2025-08-12 PROCEDURE — 3008F BODY MASS INDEX DOCD: CPT | Mod: CPTII,S$GLB,, | Performed by: PHYSICIAN ASSISTANT

## 2025-08-12 PROCEDURE — 1101F PT FALLS ASSESS-DOCD LE1/YR: CPT | Mod: CPTII,S$GLB,, | Performed by: PHYSICIAN ASSISTANT

## 2025-08-12 PROCEDURE — 1159F MED LIST DOCD IN RCRD: CPT | Mod: CPTII,S$GLB,, | Performed by: PHYSICIAN ASSISTANT

## 2025-08-12 PROCEDURE — 3044F HG A1C LEVEL LT 7.0%: CPT | Mod: CPTII,S$GLB,, | Performed by: PHYSICIAN ASSISTANT

## 2025-08-12 PROCEDURE — 3288F FALL RISK ASSESSMENT DOCD: CPT | Mod: CPTII,S$GLB,, | Performed by: PHYSICIAN ASSISTANT

## 2025-08-12 PROCEDURE — 3074F SYST BP LT 130 MM HG: CPT | Mod: CPTII,S$GLB,, | Performed by: PHYSICIAN ASSISTANT

## 2025-08-12 PROCEDURE — 1126F AMNT PAIN NOTED NONE PRSNT: CPT | Mod: CPTII,S$GLB,, | Performed by: PHYSICIAN ASSISTANT

## 2025-08-12 PROCEDURE — 99213 OFFICE O/P EST LOW 20 MIN: CPT | Mod: S$GLB,,, | Performed by: PHYSICIAN ASSISTANT

## 2025-08-12 PROCEDURE — 3078F DIAST BP <80 MM HG: CPT | Mod: CPTII,S$GLB,, | Performed by: PHYSICIAN ASSISTANT

## 2025-08-12 PROCEDURE — 99999 PR PBB SHADOW E&M-EST. PATIENT-LVL III: CPT | Mod: PBBFAC,,, | Performed by: PHYSICIAN ASSISTANT

## 2025-08-12 PROCEDURE — 4010F ACE/ARB THERAPY RXD/TAKEN: CPT | Mod: CPTII,S$GLB,, | Performed by: PHYSICIAN ASSISTANT

## 2025-08-13 PROBLEM — M62.89 PELVIC FLOOR DYSFUNCTION: Status: ACTIVE | Noted: 2025-08-13
